# Patient Record
Sex: MALE | Race: OTHER | NOT HISPANIC OR LATINO | URBAN - METROPOLITAN AREA
[De-identification: names, ages, dates, MRNs, and addresses within clinical notes are randomized per-mention and may not be internally consistent; named-entity substitution may affect disease eponyms.]

---

## 2018-08-18 ENCOUNTER — EMERGENCY (EMERGENCY)
Facility: HOSPITAL | Age: 48
LOS: 0 days | Discharge: HOME | End: 2018-08-19
Attending: EMERGENCY MEDICINE | Admitting: EMERGENCY MEDICINE

## 2018-08-18 VITALS
RESPIRATION RATE: 18 BRPM | SYSTOLIC BLOOD PRESSURE: 119 MMHG | OXYGEN SATURATION: 97 % | DIASTOLIC BLOOD PRESSURE: 70 MMHG | HEART RATE: 81 BPM

## 2018-08-18 DIAGNOSIS — S01.551A OPEN BITE OF LIP, INITIAL ENCOUNTER: ICD-10-CM

## 2018-08-18 DIAGNOSIS — S01.511A LACERATION WITHOUT FOREIGN BODY OF LIP, INITIAL ENCOUNTER: ICD-10-CM
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
## 2018-08-18 DIAGNOSIS — Z23 ENCOUNTER FOR IMMUNIZATION: ICD-10-CM
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
## 2018-08-18 RX ORDER — TETANUS TOXOID, REDUCED DIPHTHERIA TOXOID AND ACELLULAR PERTUSSIS VACCINE, ADSORBED 5; 2.5; 8; 8; 2.5 [IU]/.5ML; [IU]/.5ML; UG/.5ML; UG/.5ML; UG/.5ML
0.5 SUSPENSION INTRAMUSCULAR ONCE
Qty: 0 | Refills: 0 | Status: COMPLETED | OUTPATIENT
Start: 2018-08-18 | End: 2018-08-18

## 2018-08-18 RX ORDER — AMPICILLIN SODIUM AND SULBACTAM SODIUM 250; 125 MG/ML; MG/ML
3 INJECTION, POWDER, FOR SUSPENSION INTRAMUSCULAR; INTRAVENOUS ONCE
Qty: 0 | Refills: 0 | Status: COMPLETED | OUTPATIENT
Start: 2018-08-18 | End: 2018-08-18

## 2018-08-18 RX ADMIN — AMPICILLIN SODIUM AND SULBACTAM SODIUM 200 GRAM(S): 250; 125 INJECTION, POWDER, FOR SUSPENSION INTRAMUSCULAR; INTRAVENOUS at 23:37

## 2018-08-18 NOTE — ED ADULT NURSE NOTE - NSIMPLEMENTINTERV_GEN_ALL_ED
Implemented All Universal Safety Interventions:  [place] to call system. Call bell, personal items and telephone within reach. Instruct patient to call for assistance. Room bathroom lighting operational. Non-slip footwear when patient is off stretcher. Physically safe environment: no spills, clutter or unnecessary equipment. Stretcher in lowest position, wheels locked, appropriate side rails in place.

## 2018-08-18 NOTE — ED PROVIDER NOTE - OBJECTIVE STATEMENT
48 year old male with pmhx noted presents with dog bite to upper lip. Pt admits dog bit his lip 30 PTA. dog is vaccinated. T dap up to date.

## 2018-08-18 NOTE — CONSULT NOTE ADULT - SUBJECTIVE AND OBJECTIVE BOX
RO BHARDWAJ [id number]  48y Male    HPI: 47y/o male presents to ED w/ upper lip avulsion after being bit by his family's startled 2y/o Doberman dog. Pt states the dog's vaccinations are up to date, and the incident is an accident. Pt admits to pain around the area. Denies n/t, other symptoms.      PAST MEDICAL & SURGICAL HISTORY:  No pertinent past medical history        MEDICATIONS  (STANDING):    MEDICATIONS  (PRN):      Allergies  NKDA      Intolerances        REVIEW OF SYSTEMS    [ ] A ten-point review of systems was otherwise negative except as noted.      Vital Signs Last 24 Hrs  T(C): --  T(F): --  HR: 81 (18 Aug 2018 20:10) (81 - 81)  BP: 131/79 (18 Aug 2018 20:10) (119/70 - 131/79)  BP(mean): --  RR: 20 (18 Aug 2018 20:10) (18 - 20)  SpO2: 97% (18 Aug 2018 20:08) (97% - 97%)    PHYSICAL EXAM:  GENERAL: NAD, well-appearing  CHEST/LUNG: Clear to auscultation bilaterally  HEART: Regular rate and rhythm  ABDOMEN: Soft, Nontender, Nondistended;   EXTREMITIES:  No clubbing, cyanosis, or edema  SKIN: avulsion to upper lip involving philtrum and vermilion border      LABS:  Labs:  CAPILLARY BLOOD GLUCOSE                      LFTs:         Coags:                RADIOLOGY & ADDITIONAL STUDIES:

## 2018-08-18 NOTE — CONSULT NOTE ADULT - ASSESSMENT
47 y/o Male with upper lip avulsion involving the philtrum and vermilion border after being bit by his family's Doberman dog. Pt admits to pain of the area. Denies n/t.    PLAN:  - repair of lip avulsion

## 2018-08-18 NOTE — ED PROVIDER NOTE - NS ED ROS FT
Review of Systems:  	•	CONSTITUTIONAL - no fever, no diaphoresis, no chills  	•	SKIN - no rash  	•	EYES - no eye pain, no blurry vision  	•	ENT - no change in hearing, no sore throat, no ear pain or tinnitus  	•	MUSCULOSKELETAL - no joint paint, no swelling, no redness  	•	NEUROLOGIC - no weakness, no headache, no paresthesias, no LOC

## 2018-08-18 NOTE — ED PROVIDER NOTE - PHYSICAL EXAMINATION
CONST: Well appearing in NAD  EYES: PERRL, EOMI, Sclera and conjunctiva clear.   ENT: No nasal discharge. TM's clear B/L without drainage. Oropharynx normal appearing, no erythema or exudates. Uvula midline.  NECK: Non-tender, no meningeal signs  CARD: Normal S1 S2; Normal rate and rhythm  RESP: Equal BS B/L, No wheezes, rhonchi or rales. No distress  GI: Soft, non-tender, non-distended.  SKIN: + laceration to upper lip involving phitlum and vermillion border

## 2018-08-18 NOTE — ED ADULT TRIAGE NOTE - NS ED NURSE BANDS TYPE
Allergy;
no gum bleeding/no post-nasal discharge/no hearing difficulty/no tinnitus/no vertigo/no dysphagia/no ear pain/no nose bleeds

## 2018-08-18 NOTE — ED PROVIDER NOTE - ATTENDING CONTRIBUTION TO CARE
47 yo m here for dog bite to upper lip.  pt was bit by wife's doberman, fully vaccinated.  pt is also utd with vaccinations.  occurred 1.5 hrs ago.  exam:  mid upper lip with tissue avulsion, laceration across vermillion border, +oozing.  imp: pt with lip/facial laceration from dog bite, plastics consult, augmentin

## 2018-08-19 VITALS
SYSTOLIC BLOOD PRESSURE: 126 MMHG | OXYGEN SATURATION: 98 % | RESPIRATION RATE: 18 BRPM | TEMPERATURE: 98 F | DIASTOLIC BLOOD PRESSURE: 71 MMHG | HEART RATE: 75 BPM

## 2018-08-19 RX ADMIN — AMPICILLIN SODIUM AND SULBACTAM SODIUM 3 GRAM(S): 250; 125 INJECTION, POWDER, FOR SUSPENSION INTRAMUSCULAR; INTRAVENOUS at 00:07

## 2018-08-19 RX ADMIN — TETANUS TOXOID, REDUCED DIPHTHERIA TOXOID AND ACELLULAR PERTUSSIS VACCINE, ADSORBED 0.5 MILLILITER(S): 5; 2.5; 8; 8; 2.5 SUSPENSION INTRAMUSCULAR at 00:06

## 2018-08-19 NOTE — CONSULT NOTE ADULT - ASSESSMENT
49 y/o Male with upper lip avulsion involving the philtrum and vermilion border after being bit by his family's Doberman dog. Pt admits to pain of the area. Denies n/t.    PLAN:  - repair of lip avulsion  - IV abx  - tetanus booster 47 y/o Male with upper lip avulsion involving the philtrum and vermilion border after being bit by his family's Doberman dog. Pt admits to pain of the area. Denies n/t.    PLAN:  - repair of lip avulsion - see procedure note  - IV abx  - tetanus booster  - D/C home on Augmentin x 7 days  f/u with Dr. WHEELER in 1 week, bacitracin daily 2-3 times    D/W Dr. WHEELER

## 2018-08-19 NOTE — CONSULT NOTE ADULT - SUBJECTIVE AND OBJECTIVE BOX
RO BHARDWAJ [id number]  48y Male    HPI: 47y/o male presents to ED w/ upper lip avulsion after being bit by his family's startled 2y/o Doberman dog. Pt states the dog's vaccinations are up to date, and the incident is an accident. Pt admits to pain around the area. Denies n/t, other symptoms.      PAST MEDICAL & SURGICAL HISTORY:  No pertinent past medical history        MEDICATIONS  (STANDING):  None     MEDICATIONS  (PRN):      Allergies  NKDA      Intolerances        REVIEW OF SYSTEMS    [ ] A ten-point review of systems was otherwise negative except as noted.      Vital Signs Last 24 Hrs  T(C): --  T(F): --  HR: 81 (18 Aug 2018 20:10) (81 - 81)  BP: 131/79 (18 Aug 2018 20:10) (119/70 - 131/79)  BP(mean): --  RR: 20 (18 Aug 2018 20:10) (18 - 20)  SpO2: 97% (18 Aug 2018 20:08) (97% - 97%)    PHYSICAL EXAM:  GENERAL: NAD, well-appearing  CHEST/LUNG: Clear to auscultation bilaterally  HEART: Regular rate and rhythm  ABDOMEN: Soft, Nontender, Nondistended;   EXTREMITIES:  No clubbing, cyanosis, or edema  SKIN: avulsion to upper lip involving philtrum and vermilion border      LABS:  None                    RADIOLOGY & ADDITIONAL STUDIES:  None

## 2018-08-19 NOTE — PROCEDURE NOTE - PROCEDURE
<<-----Click on this checkbox to enter Procedure Laceration repair of face  08/19/2018    Active  [id number]

## 2025-01-13 NOTE — CONSULT NOTE ADULT - ATTENDING COMMENTS
Case d/w resident.    49 yo M with central upper lip dog bite with avulsion injury with soft tissue loss.  Bite from family dog with up-to-date vaccination.  No prior aggressive behavior.  No other associated injuries.    Exam as above  Upper lip: central lip with jagged avulsion injury flap of the philtrum and vermillion border with crushed soft tissue and likely soft tissue loss    Recommend repair after irrigation  IV abx  tetanus update  home rx: augmentin x 7 days  follow up at [address] in 1 week, call for appt.
 used